# Patient Record
Sex: MALE | Race: WHITE | HISPANIC OR LATINO | Employment: OTHER | URBAN - METROPOLITAN AREA
[De-identification: names, ages, dates, MRNs, and addresses within clinical notes are randomized per-mention and may not be internally consistent; named-entity substitution may affect disease eponyms.]

---

## 2017-05-12 ENCOUNTER — LAB VISIT (OUTPATIENT)
Dept: LAB | Facility: HOSPITAL | Age: 82
End: 2017-05-12
Attending: UROLOGY
Payer: MEDICARE

## 2017-05-12 DIAGNOSIS — C61 PC (PROSTATE CANCER): ICD-10-CM

## 2017-05-12 LAB
ALBUMIN SERPL BCP-MCNC: 4 G/DL
ALP SERPL-CCNC: 65 U/L
ALT SERPL W/O P-5'-P-CCNC: 18 U/L
ANION GAP SERPL CALC-SCNC: 11 MMOL/L
AST SERPL-CCNC: 18 U/L
BASOPHILS # BLD AUTO: 0.04 K/UL
BASOPHILS NFR BLD: 0.5 %
BILIRUB SERPL-MCNC: 0.5 MG/DL
BUN SERPL-MCNC: 17 MG/DL
CALCIUM SERPL-MCNC: 10.7 MG/DL
CHLORIDE SERPL-SCNC: 101 MMOL/L
CO2 SERPL-SCNC: 27 MMOL/L
COMPLEXED PSA SERPL-MCNC: 6.2 NG/ML
CREAT SERPL-MCNC: 1.4 MG/DL
DIFFERENTIAL METHOD: ABNORMAL
EOSINOPHIL # BLD AUTO: 0.3 K/UL
EOSINOPHIL NFR BLD: 3.3 %
ERYTHROCYTE [DISTWIDTH] IN BLOOD BY AUTOMATED COUNT: 13 %
EST. GFR  (AFRICAN AMERICAN): 52.6 ML/MIN/1.73 M^2
EST. GFR  (NON AFRICAN AMERICAN): 45.5 ML/MIN/1.73 M^2
GLUCOSE SERPL-MCNC: 114 MG/DL
HCT VFR BLD AUTO: 38.2 %
HGB BLD-MCNC: 13.1 G/DL
LYMPHOCYTES # BLD AUTO: 2.8 K/UL
LYMPHOCYTES NFR BLD: 33.4 %
MCH RBC QN AUTO: 31.5 PG
MCHC RBC AUTO-ENTMCNC: 34.3 %
MCV RBC AUTO: 92 FL
MONOCYTES # BLD AUTO: 0.8 K/UL
MONOCYTES NFR BLD: 9.3 %
NEUTROPHILS # BLD AUTO: 4.5 K/UL
NEUTROPHILS NFR BLD: 53.4 %
PLATELET # BLD AUTO: 253 K/UL
PMV BLD AUTO: 11 FL
POTASSIUM SERPL-SCNC: 4.2 MMOL/L
PROT SERPL-MCNC: 7.7 G/DL
RBC # BLD AUTO: 4.16 M/UL
SODIUM SERPL-SCNC: 139 MMOL/L
TESTOST SERPL-MCNC: 39 NG/DL
WBC # BLD AUTO: 8.41 K/UL

## 2017-05-12 PROCEDURE — 80053 COMPREHEN METABOLIC PANEL: CPT

## 2017-05-12 PROCEDURE — 84403 ASSAY OF TOTAL TESTOSTERONE: CPT

## 2017-05-12 PROCEDURE — 85025 COMPLETE CBC W/AUTO DIFF WBC: CPT

## 2017-05-12 PROCEDURE — 36415 COLL VENOUS BLD VENIPUNCTURE: CPT | Mod: PO

## 2017-05-12 PROCEDURE — 84153 ASSAY OF PSA TOTAL: CPT

## 2017-05-23 ENCOUNTER — OFFICE VISIT (OUTPATIENT)
Dept: UROLOGY | Facility: CLINIC | Age: 82
End: 2017-05-23
Attending: UROLOGY
Payer: MEDICARE

## 2017-05-23 VITALS
WEIGHT: 147.63 LBS | HEIGHT: 63 IN | DIASTOLIC BLOOD PRESSURE: 82 MMHG | HEART RATE: 70 BPM | SYSTOLIC BLOOD PRESSURE: 155 MMHG | BODY MASS INDEX: 26.16 KG/M2

## 2017-05-23 DIAGNOSIS — C61 PROSTATE CANCER: Primary | ICD-10-CM

## 2017-05-23 PROCEDURE — 99213 OFFICE O/P EST LOW 20 MIN: CPT | Mod: S$PBB,25,, | Performed by: UROLOGY

## 2017-05-23 PROCEDURE — 99999 PR PBB SHADOW E&M-EST. PATIENT-LVL III: CPT | Mod: PBBFAC,,, | Performed by: UROLOGY

## 2017-05-23 PROCEDURE — 99213 OFFICE O/P EST LOW 20 MIN: CPT | Mod: PBBFAC | Performed by: UROLOGY

## 2017-05-23 PROCEDURE — 96402 CHEMO HORMON ANTINEOPL SQ/IM: CPT | Mod: S$PBB,,, | Performed by: NURSE PRACTITIONER

## 2017-05-23 RX ADMIN — LEUPROLIDE ACETATE 45 MG: KIT SUBCUTANEOUS at 10:05

## 2017-05-23 NOTE — PROGRESS NOTES
"Subjective:      Albert Rouse is a 85 y.o. male who returns today regarding his metastatic prostate cancer doing well on androgen deprivation. His last dose of Eligard was in November 2016. He is an established patient of Dr. Levine and is new to me today. He presents today with his ex-wife, who is helping with translation.     Today the patient reports that he is doing well. Denies any urinary symptoms. Denies any symptoms of metastatic disease including bone pain and weight loss. The patient does report some hot flashes with the Eligard; however he reports this is mild. Continues to take multivitamin and calcium/vit D supplements while on ADT.     The following portions of the patient's history were reviewed and updated as appropriate: allergies, current medications, past family history, past medical history, past social history, past surgical history and problem list.    Review of Systems  Pertinent items are noted in HPI.  A comprehensive multipoint review of systems was negative except as otherwise stated in the HPI.     Objective:   Vitals:   BP (!) 155/82 (BP Location: Right arm, Patient Position: Sitting, BP Method: Automatic)   Pulse 70   Ht 5' 3" (1.6 m)   Wt 67 kg (147 lb 9.6 oz)   BMI 26.15 kg/m²     Physical Exam    General: alert and oriented, no acute distress  HEENT no teeth  Respiratory: Symmetric expansion, non-labored breathing  Cardiovascular: no peripheral edema  Abdomen: soft, non distended  Skin: normal coloration and turgor, no rashes, no suspicious skin lesions noted  Neuro: no gross deficits  Psych: normal judgment and insight, normal mood/affect and non-anxious  SANTO: nodular and firm prostate     Physical Exam    Lab Review   Urinalysis demonstrates no specimen  Lab Results   Component Value Date    WBC 8.41 05/12/2017    HGB 13.1 (L) 05/12/2017    HCT 38.2 (L) 05/12/2017    MCV 92 05/12/2017     05/12/2017     Lab Results   Component Value Date    CREATININE 1.4 05/12/2017    " BUN 17 05/12/2017     Component PSA DIAGNOSTIC   Latest Ref Rng & Units 0.00 - 4.00 ng/mL   5/12/2017 6.2 (H)   11/8/2016 3.6   2/3/2016 2.5   11/3/2015 2.3   8/11/2015 2.5   4/28/2015 3.3     Component Testosterone, Total   Latest Ref Rng & Units 195.0 - 1138.0 ng/dL   5/12/2017 39 (L)   11/8/2016 21 (L)   2/3/2016 19 (L)   11/3/2015 37 (L)     Imaging  None     Assessment and Plan:   Albert was seen today for follow-up.     Diagnoses and all orders for this visit:    Prostate cancer  -     Medication Pre-Authorization  -     Ambulatory referral to Hematology / Oncology  -     Prostate Specific Antigen, Diagnostic; Future  -     TESTOSTERONE; Future  -     CBC W/ AUTO DIFFERENTIAL; Future  -     COMPREHENSIVE METABOLIC PANEL; Future    Plan:  --PSA continues to rise on ADT  --Appointment scheduled with Dr. Magallanes to discuss xgeva/zometa   --Eligard 45 mg given today  --Follow up in 6 months with the above labs for Eligard injection  --Continue multivitamin and calcium/Vitamin D

## 2018-10-25 ENCOUNTER — TELEPHONE (OUTPATIENT)
Dept: UROLOGY | Facility: CLINIC | Age: 83
End: 2018-10-25

## 2018-10-25 NOTE — TELEPHONE ENCOUNTER
----- Message from Radha Trujillo RN sent at 10/25/2018  2:25 PM CDT -----  Pt has not seen us in over a year.  I called and left message, asking him to call back to let us know if he is seeing another practitioner or has stopped treatment for prostate cancer.

## 2019-04-29 PROCEDURE — 99223 PR INITIAL HOSPITAL CARE,LEVL III: ICD-10-PCS | Mod: S$GLB,,, | Performed by: UROLOGY

## 2019-04-29 PROCEDURE — 99223 1ST HOSP IP/OBS HIGH 75: CPT | Mod: S$GLB,,, | Performed by: UROLOGY

## 2019-04-30 ENCOUNTER — OUTSIDE PLACE OF SERVICE (OUTPATIENT)
Dept: ADMINISTRATIVE | Facility: OTHER | Age: 84
End: 2019-04-30
Payer: MEDICARE